# Patient Record
(demographics unavailable — no encounter records)

---

## 2025-03-17 NOTE — ASSESSMENT
[Diabetes Foot Care] : diabetes foot care [Carbohydrate Consistent Diet] : carbohydrate consistent diet [Importance of Diet and Exercise] : importance of diet and exercise to improve glycemic control, achieve weight loss and improve cardiovascular health [Hypoglycemia Management] : hypoglycemia management [Self Monitoring of Blood Glucose] : self monitoring of blood glucose [Weight Loss] : weight loss [FreeTextEntry1] : A.m. white male with a past medical history of generalized obesity, type 2 diabetes, hypertension and hyperlipidemia presents for routine follow-up.  Patient claimed that the Ozempic 2 mg/week is not effective as his appetite is still the same and he has not lost any significant amount of weight.  Patient is tolerating the Synjardy XR 1 tablet daily and pioglitazone 15 mg daily.  He also takes ramipril 5 mg daily and atorvastatin 10 mg daily.  Patient had a blood test recently on March 4 which showed that the complete metabolic panel was normal except for ALT of 49, total cholesterol of 140, direct LDL was 75 and hemoglobin A1c 6.7% urine was positive for albumin//creatinine ratio 113.  Recommendation 1.  I have advised the patient to discontinue the Ozempic and then 2 weeks after he stops that he will start the Mounjaro 5 mg subcutaneously weekly.  Side effects were discussed with the patient. 2.  Patient will continue with the Synjardy XR 25/1000 mg once daily and pioglitazone 15 mg daily. 3.  Patient is also advised to continue with the atorvastatin 10 mg daily and ramipril 5 mg daily.. 4.  The importance of a strict low carbohydrate and low-fat diet was discussed with the patient and the role of regular exercise was also stressed upon the patient. 5.  If the condition of the patient stays stable then he will return to the clinic in approximately 3 months time with a repeat blood analysis.  The plan was discussed in detail with the patient.

## 2025-03-17 NOTE — HISTORY OF PRESENT ILLNESS
[FreeTextEntry1] : 58-year-old male with a medical history of DM2 present for a routine follow up visit. Patient states that he recently took a vacation in February and had to be seen for vertigo from the flight to [place]. He does have a past medical history of hyperlipidemia, and HTN. He is currently taking medications of Ozempic, Atorvastatin, Pioglitazone, Ramipril, Synjardy. He has been tolerating weekly subcutaneous injections well with no side effects. He denies any significant symptoms of chest pain, sob, abdominal pain, nausea or vomiting. He noticed that his appetite recently has not been curved, advise patient to increase weekly dosage of Ozempic. He has not noticed any changes in his vision. He is compliant with his diet; weight has been stable. Circulation of the lower extremities remain stable bilateral, denies any paresthesia. Patient remains clinically stable.

## 2025-03-17 NOTE — PHYSICAL EXAM
[Alert] : alert [Well Nourished] : well nourished [No Acute Distress] : no acute distress [Well Developed] : well developed [Normal Sclera/Conjunctiva] : normal sclera/conjunctiva [EOMI] : extra ocular movement intact [No Proptosis] : no proptosis [Normal Oropharynx] : the oropharynx was normal [Thyroid Not Enlarged] : the thyroid was not enlarged [No Thyroid Nodules] : no palpable thyroid nodules [No Respiratory Distress] : no respiratory distress [No Accessory Muscle Use] : no accessory muscle use [Clear to Auscultation] : lungs were clear to auscultation bilaterally [Normal S1, S2] : normal S1 and S2 [No Murmurs] : no murmurs [Normal Rate] : heart rate was normal [Regular Rhythm] : with a regular rhythm [No Edema] : no peripheral edema [Pedal Pulses Normal] : the pedal pulses are present [Normal Appearance] : normal in appearance [Normal Bowel Sounds] : normal bowel sounds [Not Tender] : non-tender [Not Distended] : not distended [Soft] : abdomen soft [No HSM] : no hepato-splenomegaly [Normal Supraclavicular Nodes] : no supraclavicular lymphadenopathy [Normal Anterior Cervical Nodes] : no anterior cervical lymphadenopathy [Normal Posterior Cervical Nodes] : no posterior cervical lymphadenopathy [No Spinal Tenderness] : no spinal tenderness [Spine Straight] : spine straight [No Stigmata of Cushings Syndrome] : no stigmata of Cushings Syndrome [Normal Gait] : normal gait [Normal Strength/Tone] : muscle strength and tone were normal [No Rash] : no rash [No Skin Lesions] : no skin lesions [Acanthosis Nigricans] : no acanthosis nigricans [Foot Ulcers] : no foot ulcers [No Motor Deficits] : the motor exam was normal [No Sensory Deficits] : the sensory exam was normal to light touch and pinprick [Normal Reflexes] : deep tendon reflexes were 2+ and symmetric [No Tremors] : no tremors [Oriented x3] : oriented to person, place, and time [de-identified] : Patient's BMI is 41.79 and the weight is 283 pounds, generalized obesity